# Patient Record
Sex: FEMALE | Race: OTHER | NOT HISPANIC OR LATINO | URBAN - METROPOLITAN AREA
[De-identification: names, ages, dates, MRNs, and addresses within clinical notes are randomized per-mention and may not be internally consistent; named-entity substitution may affect disease eponyms.]

---

## 2019-10-22 ENCOUNTER — OUTPATIENT (OUTPATIENT)
Dept: INPATIENT UNIT | Facility: HOSPITAL | Age: 29
LOS: 1 days | Discharge: HOME | End: 2019-10-22
Payer: MEDICAID

## 2019-10-22 VITALS — HEART RATE: 96 BPM | SYSTOLIC BLOOD PRESSURE: 99 MMHG | DIASTOLIC BLOOD PRESSURE: 57 MMHG

## 2019-10-22 VITALS
HEART RATE: 100 BPM | RESPIRATION RATE: 17 BRPM | TEMPERATURE: 99 F | DIASTOLIC BLOOD PRESSURE: 63 MMHG | SYSTOLIC BLOOD PRESSURE: 106 MMHG

## 2019-10-22 DIAGNOSIS — O26.899 OTHER SPECIFIED PREGNANCY RELATED CONDITIONS, UNSPECIFIED TRIMESTER: ICD-10-CM

## 2019-10-22 DIAGNOSIS — Z3A.00 WEEKS OF GESTATION OF PREGNANCY NOT SPECIFIED: ICD-10-CM

## 2019-10-22 LAB
APPEARANCE UR: SIGNIFICANT CHANGE UP
BACTERIA # UR AUTO: SIGNIFICANT CHANGE UP
BILIRUB UR-MCNC: NEGATIVE — SIGNIFICANT CHANGE UP
BLOOD UR QL VISUAL: SIGNIFICANT CHANGE UP
COLOR SPEC: COLORLESS — SIGNIFICANT CHANGE UP
GLUCOSE UR-MCNC: NEGATIVE — SIGNIFICANT CHANGE UP
HYALINE CASTS # UR AUTO: SIGNIFICANT CHANGE UP
KETONES UR-MCNC: NEGATIVE — SIGNIFICANT CHANGE UP
LEUKOCYTE ESTERASE UR-ACNC: SIGNIFICANT CHANGE UP
NITRITE UR-MCNC: NEGATIVE — SIGNIFICANT CHANGE UP
PH UR: 7 — SIGNIFICANT CHANGE UP (ref 5–8)
PROT UR-MCNC: 20 — SIGNIFICANT CHANGE UP
RBC CASTS # UR COMP ASSIST: SIGNIFICANT CHANGE UP (ref 0–?)
SP GR SPEC: 1.01 — SIGNIFICANT CHANGE UP (ref 1–1.04)
SQUAMOUS # UR AUTO: SIGNIFICANT CHANGE UP
UROBILINOGEN FLD QL: NORMAL — SIGNIFICANT CHANGE UP
WBC UR QL: HIGH (ref 0–?)

## 2019-10-22 PROCEDURE — 99214 OFFICE O/P EST MOD 30 MIN: CPT | Mod: 25

## 2019-10-22 PROCEDURE — 59025 FETAL NON-STRESS TEST: CPT | Mod: 26

## 2019-10-22 NOTE — OB PROVIDER TRIAGE NOTE - NSOBPROVIDERNOTE_OBGYN_ALL_OB_FT
Patient is a 28y/o  @ 35 1/7wks gest who reports to triage with c/o lower abdominal "pain x 2 in 2 hrs and 30min later," since 1200 and passing mucus at 1230.  Describes the pain as intermittent and pressure in the vagina.  Denies lof/vb.  Denies dysuria, urgency, fever or chills.  Reports good fetal movements.      Denies AP Complications  Meds - pnv  NKDA    PMH/PSH/GYN/SH - denies  OB -  - 2/15/2015 - 6lbs 7oz    Vital Signs Last 24 Hrs  T(C): 37.0 (22 Oct 2019 14:35), Max: 37 (22 Oct 2019 14:14)  T(F): 98.6 (22 Oct 2019 14:35), Max: 98.6 (22 Oct 2019 14:14)  HR: 96 (22 Oct 2019 15:00) (96 - 100)  BP: 99/57 (22 Oct 2019 15:00) (99/57 - 106/63)  BP(mean): --  RR: 17 (22 Oct 2019 14:35) (17 - 17)  SpO2: --    Abdomen gravid, soft and nontender  Neg cva/suprapubic tenderness  NST - in progress  SSE - neg pooling/neg nitrazine  Cervix appears closed  SVE - C/50/-3 Intact    UA ordered Patient is a 28y/o  @ 35 1/7wks gest who reports to triage with c/o lower abdominal "pain x 2 in 2 hrs and 30min later," since 1200 and passing mucus at 1230.  Describes the pain as intermittent and pressure in the vagina.  Denies lof/vb.  Denies dysuria, urgency, fever or chills.  Reports good fetal movements.      Denies AP Complications  Meds - pnv  NKDA    PMH/PSH/GYN/SH - denies  OB -  - 2/15/2015 - 6lbs 7oz    Vital Signs Last 24 Hrs  T(C): 37.0 (22 Oct 2019 14:35), Max: 37 (22 Oct 2019 14:14)  T(F): 98.6 (22 Oct 2019 14:35), Max: 98.6 (22 Oct 2019 14:14)  HR: 96 (22 Oct 2019 15:00) (96 - 100)  BP: 99/57 (22 Oct 2019 15:00) (99/57 - 106/63)  BP(mean): --  RR: 17 (22 Oct 2019 14:35) (17 - 17)  SpO2: --    Abdomen gravid, soft and nontender  Neg cva/suprapubic tenderness  NST - in progress  SSE - neg pooling/neg nitrazine          Candida noted in the vault  Cervix appears closed  SVE - C/50/-3 Intact    UA ordered Patient is a 28y/o  @ 35 1/7wks gest who reports to triage with c/o lower abdominal "pain x 2 in 2 hrs and 30min later," since 1200 and passing mucus at 1230.  Describes the pain as intermittent and pressure in the vagina.  Denies lof/vb.  Denies dysuria, urgency, fever or chills.  Reports good fetal movements.      Denies AP Complications  Meds - pnv  NKDA    PMH/PSH/GYN/SH - denies  OB -  - 2/15/2015 - 6lbs 7oz    Vital Signs Last 24 Hrs  T(C): 37.0 (22 Oct 2019 14:35), Max: 37 (22 Oct 2019 14:14)  T(F): 98.6 (22 Oct 2019 14:35), Max: 98.6 (22 Oct 2019 14:14)  HR: 96 (22 Oct 2019 15:00) (96 - 100)  BP: 99/57 (22 Oct 2019 15:00) (99/57 - 106/63)  BP(mean): --  RR: 17 (22 Oct 2019 14:35) (17 - 17)  SpO2: --    Abdomen gravid, soft and nontender  Neg cva/suprapubic tenderness  NST - in progress  SSE - neg pooling/neg nitrazine          Candida noted in the vault  Cervix appears closed  SVE - C/50/-3 Intact  Next prenatal appointment is on Monday 10/28.    UA ordered  Urine culture sent    Discussed patient and findings with Dr Jarvis.  Plan:  patient is cleared for discharge home.  Instructed to use OTC candida treatment.  To f/u with pnc provider on Monday as scheduled.

## 2019-10-22 NOTE — OB PROVIDER TRIAGE NOTE - HISTORY OF PRESENT ILLNESS
Patient is a 28y/o  @ 35 1/7wks gest who reports to triage with c/o lower abdominal "pain x 2 in 2 hrs and 30min later," since 1200 and passing mucus at 1230.  Describes the pain as intermittent and pressure in the vagina.  Denies lof/vb.  Denies dysuria, urgency, fever or chills.  Reports good fetal movements.      Denies AP Complications  Meds - pnv  NKDA

## 2019-10-22 NOTE — OB PROVIDER TRIAGE NOTE - NSHPPHYSICALEXAM_GEN_ALL_CORE
Abdomen gravid, soft and nontender  Neg cva/suprapubic tenderness  NST - in progress  SSE - neg pooling/neg nitrazine          Candida noted in the vault  Cervix appears closed  SVE - C/50/-3 Intact

## 2019-10-22 NOTE — OB PROVIDER TRIAGE NOTE - NSHPLABSRESULTS_GEN_ALL_CORE
Urinalysis Basic - ( 22 Oct 2019 15:25 )    Color: COLORLESS / Appearance: Lt TURBID / S.011 / pH: 7.0  Gluc: NEGATIVE / Ketone: NEGATIVE  / Bili: NEGATIVE / Urobili: NORMAL   Blood: TRACE / Protein: 20 / Nitrite: NEGATIVE   Leuk Esterase: LARGE / RBC: 0-2 / WBC 11-25   Sq Epi: MANY / Non Sq Epi: x / Bacteria: SMALL

## 2019-10-24 LAB
BACTERIA UR CULT: SIGNIFICANT CHANGE UP
SPECIMEN SOURCE: SIGNIFICANT CHANGE UP

## 2019-11-14 ENCOUNTER — INPATIENT (INPATIENT)
Facility: HOSPITAL | Age: 29
LOS: 1 days | Discharge: HOME | End: 2019-11-16
Attending: SPECIALIST | Admitting: SPECIALIST

## 2019-11-14 VITALS
RESPIRATION RATE: 84 BRPM | DIASTOLIC BLOOD PRESSURE: 74 MMHG | TEMPERATURE: 98 F | HEART RATE: 16 BPM | SYSTOLIC BLOOD PRESSURE: 113 MMHG

## 2019-11-14 DIAGNOSIS — O46.93 ANTEPARTUM HEMORRHAGE, UNSPECIFIED, THIRD TRIMESTER: ICD-10-CM

## 2019-11-14 LAB
APTT BLD: 27.7 SEC — SIGNIFICANT CHANGE UP (ref 27.5–36.3)
BASOPHILS # BLD AUTO: 0.03 K/UL — SIGNIFICANT CHANGE UP (ref 0–0.2)
BASOPHILS NFR BLD AUTO: 0.3 % — SIGNIFICANT CHANGE UP (ref 0–2)
BLD GP AB SCN SERPL QL: NEGATIVE — SIGNIFICANT CHANGE UP
EOSINOPHIL # BLD AUTO: 0.06 K/UL — SIGNIFICANT CHANGE UP (ref 0–0.5)
EOSINOPHIL NFR BLD AUTO: 0.6 % — SIGNIFICANT CHANGE UP (ref 0–6)
FIBRINOGEN PPP-MCNC: 654.8 MG/DL — HIGH (ref 350–510)
HCT VFR BLD CALC: 38.6 % — SIGNIFICANT CHANGE UP (ref 34.5–45)
HGB BLD-MCNC: 12.7 G/DL — SIGNIFICANT CHANGE UP (ref 11.5–15.5)
IMM GRANULOCYTES NFR BLD AUTO: 0.8 % — SIGNIFICANT CHANGE UP (ref 0–1.5)
INR BLD: 0.98 — SIGNIFICANT CHANGE UP (ref 0.88–1.17)
LYMPHOCYTES # BLD AUTO: 19.4 % — SIGNIFICANT CHANGE UP (ref 13–44)
LYMPHOCYTES # BLD AUTO: 2.07 K/UL — SIGNIFICANT CHANGE UP (ref 1–3.3)
MCHC RBC-ENTMCNC: 26.5 PG — LOW (ref 27–34)
MCHC RBC-ENTMCNC: 32.9 % — SIGNIFICANT CHANGE UP (ref 32–36)
MCV RBC AUTO: 80.4 FL — SIGNIFICANT CHANGE UP (ref 80–100)
MONOCYTES # BLD AUTO: 0.43 K/UL — SIGNIFICANT CHANGE UP (ref 0–0.9)
MONOCYTES NFR BLD AUTO: 4 % — SIGNIFICANT CHANGE UP (ref 2–14)
NEUTROPHILS # BLD AUTO: 7.99 K/UL — HIGH (ref 1.8–7.4)
NEUTROPHILS NFR BLD AUTO: 74.9 % — SIGNIFICANT CHANGE UP (ref 43–77)
NRBC # FLD: 0 K/UL — SIGNIFICANT CHANGE UP (ref 0–0)
PLATELET # BLD AUTO: 270 K/UL — SIGNIFICANT CHANGE UP (ref 150–400)
PMV BLD: 10.6 FL — SIGNIFICANT CHANGE UP (ref 7–13)
PROTHROM AB SERPL-ACNC: 10.9 SEC — SIGNIFICANT CHANGE UP (ref 9.8–13.1)
RBC # BLD: 4.8 M/UL — SIGNIFICANT CHANGE UP (ref 3.8–5.2)
RBC # FLD: 14.3 % — SIGNIFICANT CHANGE UP (ref 10.3–14.5)
RH IG SCN BLD-IMP: POSITIVE — SIGNIFICANT CHANGE UP
RH IG SCN BLD-IMP: POSITIVE — SIGNIFICANT CHANGE UP
WBC # BLD: 10.67 K/UL — HIGH (ref 3.8–10.5)
WBC # FLD AUTO: 10.67 K/UL — HIGH (ref 3.8–10.5)

## 2019-11-14 RX ORDER — IBUPROFEN 200 MG
600 TABLET ORAL EVERY 6 HOURS
Refills: 0 | Status: COMPLETED | OUTPATIENT
Start: 2019-11-14 | End: 2020-10-12

## 2019-11-14 RX ORDER — SODIUM CHLORIDE 9 MG/ML
1000 INJECTION, SOLUTION INTRAVENOUS
Refills: 0 | Status: DISCONTINUED | OUTPATIENT
Start: 2019-11-14 | End: 2019-11-14

## 2019-11-14 RX ORDER — ACETAMINOPHEN 500 MG
975 TABLET ORAL
Refills: 0 | Status: DISCONTINUED | OUTPATIENT
Start: 2019-11-14 | End: 2019-11-16

## 2019-11-14 RX ORDER — OXYCODONE HYDROCHLORIDE 5 MG/1
5 TABLET ORAL ONCE
Refills: 0 | Status: DISCONTINUED | OUTPATIENT
Start: 2019-11-14 | End: 2019-11-16

## 2019-11-14 RX ORDER — TETANUS TOXOID, REDUCED DIPHTHERIA TOXOID AND ACELLULAR PERTUSSIS VACCINE, ADSORBED 5; 2.5; 8; 8; 2.5 [IU]/.5ML; [IU]/.5ML; UG/.5ML; UG/.5ML; UG/.5ML
0.5 SUSPENSION INTRAMUSCULAR ONCE
Refills: 0 | Status: COMPLETED | OUTPATIENT
Start: 2019-11-14

## 2019-11-14 RX ORDER — MAGNESIUM HYDROXIDE 400 MG/1
30 TABLET, CHEWABLE ORAL
Refills: 0 | Status: DISCONTINUED | OUTPATIENT
Start: 2019-11-14 | End: 2019-11-16

## 2019-11-14 RX ORDER — SODIUM CHLORIDE 9 MG/ML
3 INJECTION INTRAMUSCULAR; INTRAVENOUS; SUBCUTANEOUS EVERY 8 HOURS
Refills: 0 | Status: DISCONTINUED | OUTPATIENT
Start: 2019-11-14 | End: 2019-11-16

## 2019-11-14 RX ORDER — OXYTOCIN 10 UNIT/ML
333.33 VIAL (ML) INJECTION
Qty: 20 | Refills: 0 | Status: DISCONTINUED | OUTPATIENT
Start: 2019-11-14 | End: 2019-11-15

## 2019-11-14 RX ORDER — BENZOCAINE 10 %
1 GEL (GRAM) MUCOUS MEMBRANE EVERY 6 HOURS
Refills: 0 | Status: DISCONTINUED | OUTPATIENT
Start: 2019-11-14 | End: 2019-11-16

## 2019-11-14 RX ORDER — GLYCERIN ADULT
1 SUPPOSITORY, RECTAL RECTAL AT BEDTIME
Refills: 0 | Status: DISCONTINUED | OUTPATIENT
Start: 2019-11-14 | End: 2019-11-16

## 2019-11-14 RX ORDER — INFLUENZA VIRUS VACCINE 15; 15; 15; 15 UG/.5ML; UG/.5ML; UG/.5ML; UG/.5ML
0.5 SUSPENSION INTRAMUSCULAR ONCE
Refills: 0 | Status: COMPLETED | OUTPATIENT
Start: 2019-11-14 | End: 2019-11-16

## 2019-11-14 RX ORDER — OXYTOCIN 10 UNIT/ML
2 VIAL (ML) INJECTION
Qty: 30 | Refills: 0 | Status: DISCONTINUED | OUTPATIENT
Start: 2019-11-14 | End: 2019-11-14

## 2019-11-14 RX ORDER — DIBUCAINE 1 %
1 OINTMENT (GRAM) RECTAL EVERY 6 HOURS
Refills: 0 | Status: DISCONTINUED | OUTPATIENT
Start: 2019-11-14 | End: 2019-11-16

## 2019-11-14 RX ORDER — PRAMOXINE HYDROCHLORIDE 150 MG/15G
1 AEROSOL, FOAM RECTAL EVERY 4 HOURS
Refills: 0 | Status: DISCONTINUED | OUTPATIENT
Start: 2019-11-14 | End: 2019-11-16

## 2019-11-14 RX ORDER — SIMETHICONE 80 MG/1
80 TABLET, CHEWABLE ORAL EVERY 4 HOURS
Refills: 0 | Status: DISCONTINUED | OUTPATIENT
Start: 2019-11-14 | End: 2019-11-16

## 2019-11-14 RX ORDER — DIPHENHYDRAMINE HCL 50 MG
25 CAPSULE ORAL EVERY 6 HOURS
Refills: 0 | Status: DISCONTINUED | OUTPATIENT
Start: 2019-11-14 | End: 2019-11-16

## 2019-11-14 RX ORDER — KETOROLAC TROMETHAMINE 30 MG/ML
30 SYRINGE (ML) INJECTION ONCE
Refills: 0 | Status: DISCONTINUED | OUTPATIENT
Start: 2019-11-14 | End: 2019-11-14

## 2019-11-14 RX ORDER — AER TRAVELER 0.5 G/1
1 SOLUTION RECTAL; TOPICAL EVERY 4 HOURS
Refills: 0 | Status: DISCONTINUED | OUTPATIENT
Start: 2019-11-14 | End: 2019-11-16

## 2019-11-14 RX ORDER — OXYCODONE HYDROCHLORIDE 5 MG/1
5 TABLET ORAL
Refills: 0 | Status: DISCONTINUED | OUTPATIENT
Start: 2019-11-14 | End: 2019-11-16

## 2019-11-14 RX ORDER — LANOLIN
1 OINTMENT (GRAM) TOPICAL EVERY 6 HOURS
Refills: 0 | Status: DISCONTINUED | OUTPATIENT
Start: 2019-11-14 | End: 2019-11-16

## 2019-11-14 RX ORDER — HYDROCORTISONE 1 %
1 OINTMENT (GRAM) TOPICAL EVERY 6 HOURS
Refills: 0 | Status: DISCONTINUED | OUTPATIENT
Start: 2019-11-14 | End: 2019-11-16

## 2019-11-14 RX ADMIN — SODIUM CHLORIDE 125 MILLILITER(S): 9 INJECTION, SOLUTION INTRAVENOUS at 11:46

## 2019-11-14 RX ADMIN — Medication 2 MILLIUNIT(S)/MIN: at 17:20

## 2019-11-14 RX ADMIN — Medication 1000 MILLIUNIT(S)/MIN: at 22:08

## 2019-11-14 RX ADMIN — Medication 30 MILLIGRAM(S): at 22:08

## 2019-11-14 NOTE — DISCHARGE NOTE OB - CARE PROVIDER_API CALL
Suhas Jarvis)  Obstetrics and Gynecology  [address] [facility], [place], [address] [place] [address]  Phone: [phone]  Fax: [phone]  Follow Up Time:

## 2019-11-14 NOTE — CHART NOTE - NSCHARTNOTEFT_GEN_A_CORE
NP note    Pt seen for cervical evaluation. Pt comfortable with epidural.     Vital Signs Last 24 Hrs  T(C): 36.8 (14 Nov 2019 16:05), Max: 37 (14 Nov 2019 11:19)  T(F): 98.24 (14 Nov 2019 16:05), Max: 98.6 (14 Nov 2019 11:19)  HR: 87 (14 Nov 2019 16:34) (16 - 105)  BP: 99/51 (14 Nov 2019 16:30) (85/49 - 116/60)  BP(mean): --  RR: 16 (14 Nov 2019 11:19) (16 - 84)  SpO2: 98% (14 Nov 2019 16:34) (90% - 100%)    EFM Cat I   [place] q2-4min  SVE 3-4/70/-3 thick mec noted    For pitocin augmentation as per Dr. Annette sharma, NP

## 2019-11-14 NOTE — OB PROVIDER TRIAGE NOTE - HISTORY OF PRESENT ILLNESS
28 yo [place] citizen woman presents at @ 38.3 wks GA 2   with c/o CTx q 5 minutes since approx. 0700 and some vaginal spotting after using the BR . Denies any Bright red vaginal bleeding, LOF, and reports good FM's  PNC: Dr Whittington 30 yo Brazilian woman presents at @ 38.3 wks GA 2   with c/o CTx q 5 minutes since approx. 0700 and some vaginal spotting after using the BR . Reported Bright red vaginal bleeding approx 1/2 of a pad this AM at 0700 after using the BR LOF, and reports good FM's  PNC: Dr Whittington

## 2019-11-14 NOTE — OB PROVIDER H&P - NSHPPHYSICALEXAM_GEN_ALL_CORE
A/O x 3  NAD  Vital Signs Last 24 Hrs  T(C): 36.9 (14 Nov 2019 09:01), Max: 36.9 (14 Nov 2019 09:01)  T(F): 98.4 (14 Nov 2019 09:01), Max: 98.4 (14 Nov 2019 09:01)  HR: 85 (14 Nov 2019 09:20) (16 - 85)  BP: 108/69 (14 Nov 2019 09:20) (108/69 - 113/74)  BP(mean): --  RR: 84 (14 Nov 2019 09:01) (84 - 84)  SpO2: --  Abdomen is soft NT and gravid with no ctx palpable  SSE: No evidence of active red VB appears to be dark brown scant amount  SVE: 2/50/-3 Intact    @ 1000   BPP performe TAS at the bedside : VTX, THOM @ 6.5, BPP 8/8, Posterior placenta  NST in progress  SSE: Repeated : Approx  cc's of active RED Blood removed from vaginal vault with oozing from the os  NST in Progress

## 2019-11-14 NOTE — OB PROVIDER TRIAGE NOTE - NSHPPHYSICALEXAM_GEN_ALL_CORE
A/O x 3  NAD  Vital Signs Last 24 Hrs  T(C): 36.9 (14 Nov 2019 09:01), Max: 36.9 (14 Nov 2019 09:01)  T(F): 98.4 (14 Nov 2019 09:01), Max: 98.4 (14 Nov 2019 09:01)  HR: 85 (14 Nov 2019 09:20) (16 - 85)  BP: 108/69 (14 Nov 2019 09:20) (108/69 - 113/74)  BP(mean): --  RR: 84 (14 Nov 2019 09:01) (84 - 84)  SpO2: --  Abdomen is soft NT and gravid with no ctx palpable  SSE: No evidence of active red VB appears to be dark brown scant amount  SVE: 2/50/-3 Intact  NST in Progress A/O x 3  NAD  Vital Signs Last 24 Hrs  T(C): 36.9 (14 Nov 2019 09:01), Max: 36.9 (14 Nov 2019 09:01)  T(F): 98.4 (14 Nov 2019 09:01), Max: 98.4 (14 Nov 2019 09:01)  HR: 85 (14 Nov 2019 09:20) (16 - 85)  BP: 108/69 (14 Nov 2019 09:20) (108/69 - 113/74)  BP(mean): --  RR: 84 (14 Nov 2019 09:01) (84 - 84)  SpO2: --  Abdomen is soft NT and gravid with no ctx palpable  SSE: No evidence of active red VB appears to be dark brown scant amount  SVE: 2/50/-3 Intact    @ 1000 BPP performe TAS at the bedside : VTX, THOM @ 6.5, BPP 8/8, Posterior placenta  NST in progress  SSE: Repeated : Approx  cc's of active RED Blood removed from vaginal vault with oozing from the os  NST in Progress

## 2019-11-14 NOTE — CHART NOTE - NSCHARTNOTEFT_GEN_A_CORE
NP note    Pt seen for increased pain    Vital Signs Last 24 Hrs  T(C): 37 (14 Nov 2019 11:19), Max: 37 (14 Nov 2019 11:19)  T(F): 98.6 (14 Nov 2019 11:19), Max: 98.6 (14 Nov 2019 11:19)  HR: 91 (14 Nov 2019 12:49) (16 - 91)  BP: 112/73 (14 Nov 2019 11:19) (108/69 - 113/74)  BP(mean): --  RR: 16 (14 Nov 2019 11:19) (16 - 84)  SpO2: 98% (14 Nov 2019 12:54) (97% - 98%)    EFM Cat I   [place] q4-5min  SVE 2-3/60/-3    CBC/coags WNL    Dr. Bryant at bedside discussing plan of care    -Approved for epidural followed by GERALDO sharma NP

## 2019-11-14 NOTE — DISCHARGE NOTE OB - MATERIALS PROVIDED
Guide to Postpartum Care/Bottle Feeding Log/Breastfeeding Log/Cavour  Immunization Record/Shaken Baby Prevention Handout/Birth Certificate Instructions

## 2019-11-14 NOTE — PROGRESS NOTE ADULT - SUBJECTIVE AND OBJECTIVE BOX
Pt comfortable  145. min-mod variability  no decels  ctx q3min  VE 3/50/-2  No bleeding noted.  AROM meconeum  Labor - consider pit augmentation

## 2019-11-14 NOTE — OB RN PATIENT PROFILE - NS PRO RUBELLA RECEIVED Y/N
Wound Color?: pink Follow Up Units (Optional): 0 Wound Evaluated By (Optional): Dr. Alonso Detail Level: Detailed Wound Edema?: mild no...

## 2019-11-14 NOTE — OB RN DELIVERY SUMMARY - NS_SEPSISRSKCALC_OBGYN_ALL_OB_FT
EOS calculated successfully. EOS Risk Factor: 0.5/1000 live births ([facility] national incidence); GA=38w3d; Temp=98.6; ROM=7.75; GBS='Unknown'; Antibiotics='Broad spectrum antibiotics > 4 hrs prior to birth'

## 2019-11-14 NOTE — OB PROVIDER TRIAGE NOTE - NSOBPROVIDERNOTE_OBGYN_ALL_OB_FT
28 yo at 38.3 wks GA  to r/o active labor 30 yo at 38.3 wks GA  to r/o active labor with Bright red vaginal Bleeding and Cat 2 FHRT at this time  Plan for admission and Close observation  See admission Notes  Type and Cross x 2 units  Will DW Dr Bryant (OB Covering Attending) and OB Residency Team aware (Dr Dupont PG3)

## 2019-11-14 NOTE — DISCHARGE NOTE OB - PATIENT PORTAL LINK FT
You can access the FollowMyHealth Patient Portal offered by [facility] by registering at the following website: [email or web address]. By joining [employer]’s FollowMyHealth portal, you will also be able to view your health information using other applications (apps) compatible with our system.

## 2019-11-14 NOTE — OB RN DELIVERY SUMMARY - NS_LABORCHARACTER_OBGYN_ALL_OB
Induction of labor-Medicinal/Induction of labor-AROM/Augmentation of labor/External electronic FM/Meconium staining

## 2019-11-14 NOTE — OB PROVIDER DELIVERY SUMMARY - NSPROVIDERDELIVERYNOTE_OBGYN_ALL_OB_FT
Spontaneous vaginal delivery of liveborn infant from RETA position. Head, shoulders and body were delivered easily. Infant was suctioned. No Mec. Delayed cord clamping performed. Cord was clamped and cut and infant was passed to mother then pediatrics. Placenta was delivered intact with 3 vessel cord. Fundal massage was given and uterine fundus was found to be firm. Vaginal exam revealed an intact cervix, vaginal walls and sulci. Patient had a 2nd degree laceration in the perineum that was repaired with 2.0 chromic suture. Excellent hemostasis was noted. Patient stable. Count was correct x2. Spontaneous vaginal delivery of liveborn infant from RETA position. Head, shoulders and body were delivered easily. Infant was suctioned. Heavy Mec. Delayed cord clamping performed. Cord was clamped and cut and infant was passed to mother then pediatrics. Placenta was delivered intact with 3 vessel cord. Fundal massage was given and uterine fundus was found to be firm. Vaginal exam revealed an intact cervix, vaginal walls and sulci. Patient had a 2nd degree laceration in the perineum that was repaired with 2.0 chromic suture. Excellent hemostasis was noted. Patient stable. Count was correct x2.

## 2019-11-14 NOTE — DISCHARGE NOTE OB - CARE PLAN
Principal Discharge DX:	Labor and delivery, indication for care  Goal:	routine  Assessment and plan of treatment:	 care

## 2019-11-14 NOTE — OB PROVIDER H&P - HISTORY OF PRESENT ILLNESS
30 yo [place] woman presents at @ 38.3 wks GA 2   with c/o CTx q 5 minutes since approx. 0700 and some vaginal spotting after using the BR . Reported Bright red vaginal bleeding approx 1/2 of a pad this AM at 0700 after using the BR LOF, and reports good FM's  PNC: Dr Whittington

## 2019-11-15 LAB — T PALLIDUM AB TITR SER: NEGATIVE — SIGNIFICANT CHANGE UP

## 2019-11-15 RX ORDER — IBUPROFEN 200 MG
600 TABLET ORAL EVERY 6 HOURS
Refills: 0 | Status: DISCONTINUED | OUTPATIENT
Start: 2019-11-15 | End: 2019-11-16

## 2019-11-15 RX ADMIN — SODIUM CHLORIDE 3 MILLILITER(S): 9 INJECTION INTRAMUSCULAR; INTRAVENOUS; SUBCUTANEOUS at 22:00

## 2019-11-15 RX ADMIN — Medication 1 TABLET(S): at 13:24

## 2019-11-15 RX ADMIN — Medication 600 MILLIGRAM(S): at 14:24

## 2019-11-15 RX ADMIN — SODIUM CHLORIDE 3 MILLILITER(S): 9 INJECTION INTRAMUSCULAR; INTRAVENOUS; SUBCUTANEOUS at 13:27

## 2019-11-15 RX ADMIN — Medication 600 MILLIGRAM(S): at 18:41

## 2019-11-15 RX ADMIN — Medication 1 APPLICATION(S): at 20:05

## 2019-11-15 RX ADMIN — Medication 975 MILLIGRAM(S): at 10:00

## 2019-11-15 RX ADMIN — Medication 975 MILLIGRAM(S): at 11:00

## 2019-11-15 RX ADMIN — Medication 600 MILLIGRAM(S): at 06:04

## 2019-11-15 RX ADMIN — Medication 1 APPLICATION(S): at 20:04

## 2019-11-15 RX ADMIN — AER TRAVELER 1 APPLICATION(S): 0.5 SOLUTION RECTAL; TOPICAL at 20:04

## 2019-11-15 RX ADMIN — SODIUM CHLORIDE 3 MILLILITER(S): 9 INJECTION INTRAMUSCULAR; INTRAVENOUS; SUBCUTANEOUS at 05:26

## 2019-11-15 RX ADMIN — Medication 600 MILLIGRAM(S): at 20:04

## 2019-11-15 RX ADMIN — Medication 600 MILLIGRAM(S): at 13:24

## 2019-11-15 RX ADMIN — Medication 600 MILLIGRAM(S): at 05:25

## 2019-11-15 NOTE — OB NEONATOLOGY/PEDIATRICIAN DELIVERY SUMMARY - NSPEDSNEONOTESA_OBGYN_ALL_OB_FT
38.3 week female born via  with cat II tracing to a 28 y/o  B+, GBS unknown, PNL unremarkable with AROM @ 1350 on  and heavy meconium. No significant maternal medical history. Infant emerged with strong cry and apgars /9. EOS 0.20. Highest maternal temp 37.2. Breast and bottle feeding, yes to hep B. Routine care provided and transferred to well baby nursery.    BW 2680  TOB 21:35     ADOD     Gen: NAD; well-appearing  HEENT: NC/AT; AFOF; ears and nose clinically patent, normally set; no tags ; no cleft lip/palate, oropharynx clear  Skin: pink, warm, well-perfused, no rash  Resp: CTAB, even, non-labored breathing  Cardiac: RRR, normal S1/S2; no murmurs; 2+ femoral pulses b/l  Abd: soft, NT/ND; +BS; no HSM, no masses palpated; umbilicus c/d/I, 3 vessels  Back: spine straight, no dimples or zenon  Extremities: FROM; no crepitus; negative O/B  : Rj I; no abnormalities; no hernia; anus patent  Neuro: normal tone; + Maico, suck, grasp, Babinski

## 2019-11-15 NOTE — PROGRESS NOTE ADULT - SUBJECTIVE AND OBJECTIVE BOX
S: Patient doing well.     Pain controlled.  +OOB.  +void.    Tolerating PO.  Lochia WNL.    O: Vital Signs Last 24 Hrs  T(C): 37.2 (2019 21:45), Max: 37.2 (2019 21:45)  T(F): 99 (2019 21:45), Max: 99 (2019 21:45)  HR: 103 (2019 23:33) (16 - 128)  BP: 100/63 (2019 23:33) (85/49 - 120/58)  BP(mean): 81 (2019 22:45) (74 - 81)  RR: 16 (2019 11:19) (16 - 84)  SpO2: 94% (2019 23:30) (89% - 100%)      Pt without complaints  vital signs stable  Abdomen soft  fundus firm, non tender  extremities non tender  lochia wnl    Patient doing well  Routine post partum care    Labs:                        12.7   10.67 )-----------( 270      ( 2019 10:49 )             38.6       ABO Interpretation: B ( @ 10:26)    Rh Interpretation: Positive ( @ 10:26)    Antibody Screen Negative      A: 29y s/p  doing well.   -Continue routine postpartum care.  -Discharge planned for tomorrow

## 2019-11-16 VITALS
RESPIRATION RATE: 17 BRPM | HEART RATE: 81 BPM | TEMPERATURE: 98 F | DIASTOLIC BLOOD PRESSURE: 58 MMHG | OXYGEN SATURATION: 98 % | SYSTOLIC BLOOD PRESSURE: 96 MMHG

## 2019-11-16 RX ORDER — ACETAMINOPHEN 500 MG
3 TABLET ORAL
Qty: 0 | Refills: 0 | DISCHARGE
Start: 2019-11-16

## 2019-11-16 RX ORDER — TETANUS TOXOID, REDUCED DIPHTHERIA TOXOID AND ACELLULAR PERTUSSIS VACCINE, ADSORBED 5; 2.5; 8; 8; 2.5 [IU]/.5ML; [IU]/.5ML; UG/.5ML; UG/.5ML; UG/.5ML
0.5 SUSPENSION INTRAMUSCULAR ONCE
Refills: 0 | Status: COMPLETED | OUTPATIENT
Start: 2019-11-16 | End: 2019-11-16

## 2019-11-16 RX ORDER — IBUPROFEN 200 MG
1 TABLET ORAL
Qty: 0 | Refills: 0 | DISCHARGE
Start: 2019-11-16

## 2019-11-16 RX ADMIN — Medication 975 MILLIGRAM(S): at 10:00

## 2019-11-16 RX ADMIN — INFLUENZA VIRUS VACCINE 0.5 MILLILITER(S): 15; 15; 15; 15 SUSPENSION INTRAMUSCULAR at 05:53

## 2019-11-16 RX ADMIN — Medication 600 MILLIGRAM(S): at 01:00

## 2019-11-16 RX ADMIN — Medication 600 MILLIGRAM(S): at 06:40

## 2019-11-16 RX ADMIN — TETANUS TOXOID, REDUCED DIPHTHERIA TOXOID AND ACELLULAR PERTUSSIS VACCINE, ADSORBED 0.5 MILLILITER(S): 5; 2.5; 8; 8; 2.5 SUSPENSION INTRAMUSCULAR at 05:54

## 2019-11-16 RX ADMIN — Medication 600 MILLIGRAM(S): at 00:24

## 2019-11-16 RX ADMIN — Medication 600 MILLIGRAM(S): at 05:53

## 2019-11-16 RX ADMIN — Medication 975 MILLIGRAM(S): at 09:02
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
